# Patient Record
Sex: MALE | Race: WHITE | NOT HISPANIC OR LATINO | Employment: FULL TIME | URBAN - METROPOLITAN AREA
[De-identification: names, ages, dates, MRNs, and addresses within clinical notes are randomized per-mention and may not be internally consistent; named-entity substitution may affect disease eponyms.]

---

## 2020-03-14 ENCOUNTER — OFFICE VISIT (OUTPATIENT)
Dept: URGENT CARE | Facility: CLINIC | Age: 40
End: 2020-03-14
Payer: COMMERCIAL

## 2020-03-14 VITALS
WEIGHT: 210 LBS | DIASTOLIC BLOOD PRESSURE: 89 MMHG | BODY MASS INDEX: 31.1 KG/M2 | HEIGHT: 69 IN | HEART RATE: 89 BPM | TEMPERATURE: 98 F | OXYGEN SATURATION: 96 % | SYSTOLIC BLOOD PRESSURE: 159 MMHG | RESPIRATION RATE: 17 BRPM

## 2020-03-14 DIAGNOSIS — R09.81 NASAL CONGESTION: ICD-10-CM

## 2020-03-14 DIAGNOSIS — B34.9 VIRAL SYNDROME: Primary | ICD-10-CM

## 2020-03-14 DIAGNOSIS — R06.2 WHEEZING: ICD-10-CM

## 2020-03-14 DIAGNOSIS — J34.89 SINUS PRESSURE: ICD-10-CM

## 2020-03-14 DIAGNOSIS — R05.9 COUGH: ICD-10-CM

## 2020-03-14 DIAGNOSIS — R09.82 POST-NASAL DRIP: ICD-10-CM

## 2020-03-14 DIAGNOSIS — R06.02 SHORTNESS OF BREATH: ICD-10-CM

## 2020-03-14 DIAGNOSIS — R03.0 ELEVATED BLOOD PRESSURE READING: ICD-10-CM

## 2020-03-14 LAB
CTP QC/QA: YES
FLUAV AG NPH QL: NEGATIVE
FLUBV AG NPH QL: NEGATIVE

## 2020-03-14 PROCEDURE — 87804 INFLUENZA ASSAY W/OPTIC: CPT | Mod: 59,QW,S$GLB, | Performed by: NURSE PRACTITIONER

## 2020-03-14 PROCEDURE — 99214 PR OFFICE/OUTPT VISIT, EST, LEVL IV, 30-39 MIN: ICD-10-PCS | Mod: 25,S$GLB,, | Performed by: NURSE PRACTITIONER

## 2020-03-14 PROCEDURE — 87804 POCT INFLUENZA A/B: ICD-10-PCS | Mod: 59,QW,S$GLB, | Performed by: NURSE PRACTITIONER

## 2020-03-14 PROCEDURE — 99214 OFFICE O/P EST MOD 30 MIN: CPT | Mod: 25,S$GLB,, | Performed by: NURSE PRACTITIONER

## 2020-03-14 PROCEDURE — 71046 XR CHEST PA AND LATERAL: ICD-10-PCS | Mod: FY,S$GLB,, | Performed by: RADIOLOGY

## 2020-03-14 PROCEDURE — 71046 X-RAY EXAM CHEST 2 VIEWS: CPT | Mod: FY,S$GLB,, | Performed by: RADIOLOGY

## 2020-03-14 RX ORDER — LISDEXAMFETAMINE DIMESYLATE 60 MG/1
60 CAPSULE ORAL EVERY MORNING
COMMUNITY

## 2020-03-14 RX ORDER — PROMETHAZINE HYDROCHLORIDE AND DEXTROMETHORPHAN HYDROBROMIDE 6.25; 15 MG/5ML; MG/5ML
5 SYRUP ORAL EVERY 6 HOURS PRN
Qty: 100 ML | Refills: 0 | Status: SHIPPED | OUTPATIENT
Start: 2020-03-14 | End: 2020-03-21

## 2020-03-14 RX ORDER — ALBUTEROL SULFATE 90 UG/1
2 AEROSOL, METERED RESPIRATORY (INHALATION) EVERY 6 HOURS PRN
Qty: 1 G | Refills: 0 | Status: SHIPPED | OUTPATIENT
Start: 2020-03-14 | End: 2020-03-21

## 2020-03-14 RX ORDER — BENZONATATE 100 MG/1
100 CAPSULE ORAL 3 TIMES DAILY PRN
Qty: 21 CAPSULE | Refills: 0 | Status: SHIPPED | OUTPATIENT
Start: 2020-03-14 | End: 2020-03-21

## 2020-03-14 RX ORDER — AZELASTINE 1 MG/ML
1 SPRAY, METERED NASAL 2 TIMES DAILY
Qty: 30 ML | Refills: 0 | Status: SHIPPED | OUTPATIENT
Start: 2020-03-14 | End: 2020-03-28

## 2020-03-14 NOTE — PROGRESS NOTES
"Subjective:       Patient ID: Quan Adams is a 39 y.o. male.    Vitals:  height is 5' 9" (1.753 m) and weight is 95.3 kg (210 lb). His tympanic temperature is 97.7 °F (36.5 °C). His blood pressure is 159/89 (abnormal) and his pulse is 89. His respiration is 17 and oxygen saturation is 96%.     Chief Complaint: Cough    Patient presents with cough , sinus congestion and green sputum. Patient says he is congested and sometimes complains of shortness of breath . Patient says he was checked at work by a nurse and says his lungs are clear . Patient denies fever and says this illness has been going around his work for a few months . Onset 3 days ago .     Cough   This is a new problem. The current episode started in the past 7 days (3 days ago ). The problem has been unchanged. The problem occurs hourly. The cough is productive of sputum. Associated symptoms include nasal congestion, postnasal drip and shortness of breath. Pertinent negatives include no chest pain, chills, fever, headaches, myalgias, rash or sore throat. Treatments tried: nyquil,mucinex  The treatment provided mild relief. There is no history of asthma, bronchiectasis or emphysema.       Constitution: Negative for chills, fatigue and fever.   HENT: Positive for congestion and postnasal drip. Negative for sore throat.    Neck: Negative for painful lymph nodes.   Cardiovascular: Negative for chest pain and leg swelling.   Eyes: Negative for double vision and blurred vision.   Respiratory: Positive for cough, sputum production and shortness of breath.    Gastrointestinal: Negative for nausea, vomiting and diarrhea.   Genitourinary: Negative for dysuria, frequency and urgency.   Musculoskeletal: Negative for joint pain, joint swelling, muscle cramps and muscle ache.   Skin: Negative for color change, pale and rash.   Allergic/Immunologic: Negative for seasonal allergies.   Neurological: Negative for dizziness, history of vertigo, light-headedness, passing " out and headaches.   Hematologic/Lymphatic: Negative for swollen lymph nodes, easy bruising/bleeding and history of blood clots. Does not bruise/bleed easily.   Psychiatric/Behavioral: Negative for nervous/anxious, sleep disturbance and depression. The patient is not nervous/anxious.        Objective:      Physical Exam   Constitutional: He is oriented to person, place, and time. He appears well-developed and well-nourished. He is cooperative.  Non-toxic appearance. He does not have a sickly appearance. He does not appear ill. No distress.   HENT:   Head: Normocephalic and atraumatic.   Right Ear: Hearing, external ear and ear canal normal. A middle ear effusion is present.   Left Ear: Hearing, external ear and ear canal normal. A middle ear effusion is present.   Nose: Mucosal edema present. No rhinorrhea or nasal deformity. No epistaxis. Right sinus exhibits maxillary sinus tenderness. Right sinus exhibits no frontal sinus tenderness. Left sinus exhibits maxillary sinus tenderness. Left sinus exhibits no frontal sinus tenderness.   Mouth/Throat: Uvula is midline and mucous membranes are normal. No trismus in the jaw. Normal dentition. No uvula swelling. Posterior oropharyngeal erythema present. No oropharyngeal exudate or posterior oropharyngeal edema.   Eyes: Conjunctivae and lids are normal. No scleral icterus.   Neck: Trachea normal, full passive range of motion without pain and phonation normal. Neck supple. No neck rigidity. No edema and no erythema present.   Cardiovascular: Normal rate, regular rhythm, normal heart sounds, intact distal pulses and normal pulses.   Pulmonary/Chest: Effort normal. No respiratory distress. He has no decreased breath sounds. He has wheezes in the right lower field. He has rhonchi in the right lower field and the left lower field.   Abdominal: Normal appearance.   Musculoskeletal: Normal range of motion. He exhibits no edema or deformity.   Lymphadenopathy:     He has no  cervical adenopathy.   Neurological: He is alert and oriented to person, place, and time. He exhibits normal muscle tone. Coordination normal.   Skin: Skin is warm, dry, intact, not diaphoretic and not pale.   Psychiatric: He has a normal mood and affect. His speech is normal and behavior is normal. Judgment and thought content normal. Cognition and memory are normal.   Nursing note and vitals reviewed.        Assessment:       1. Viral syndrome    2. Nasal congestion    3. Shortness of breath    4. Post-nasal drip    5. Elevated blood pressure reading    6. Cough    7. Sinus pressure    8. Wheezing        Plan:         Viral syndrome  -     azelastine (ASTELIN) 137 mcg (0.1 %) nasal spray; 1 spray (137 mcg total) by Nasal route 2 (two) times daily. for 14 days  Dispense: 30 mL; Refill: 0    Nasal congestion  -     POCT Influenza A/B    Shortness of breath  -     X-Ray Chest PA And Lateral; Future; Expected date: 03/14/2020  -     albuterol (PROVENTIL HFA) 90 mcg/actuation inhaler; Inhale 2 puffs into the lungs every 6 (six) hours as needed for Wheezing. Rescue  Dispense: 1 g; Refill: 0    Post-nasal drip    Elevated blood pressure reading    Cough  -     benzonatate (TESSALON) 100 MG capsule; Take 1 capsule (100 mg total) by mouth 3 (three) times daily as needed for Cough.  Dispense: 21 capsule; Refill: 0  -     promethazine-dextromethorphan (PROMETHAZINE-DM) 6.25-15 mg/5 mL Syrp; Take 5 mLs by mouth every 6 (six) hours as needed (May take every 4 hours, PRN. DoNOT take more than 30 mL in 24 hours.).  Dispense: 100 mL; Refill: 0    Sinus pressure    Wheezing  -     albuterol (PROVENTIL HFA) 90 mcg/actuation inhaler; Inhale 2 puffs into the lungs every 6 (six) hours as needed for Wheezing. Rescue  Dispense: 1 g; Refill: 0      Results for orders placed or performed in visit on 03/14/20   POCT Influenza A/B   Result Value Ref Range    Rapid Influenza A Ag Negative Negative    Rapid Influenza B Ag Negative Negative     " Acceptable Yes      Patient Instructions     Always follow your healthcare professional's instructions.    You have received urgent care diagnosis and treatment and you may be released before all of your medical problems are known or treated. Unless you have been given a referral, you (the patient), will arrange for follow-up care as instructed.     If you have been given a referral, [facility] will contact you (their phone number is [phone]). If they do NOT call you by the end of the business day, please call them the following day.    You have been diagnosed with Viral Syndrome. TREATMENT: YOUR TREATMENT IS AIMED AT SYMPTOM MANAGEMENT.       A viral illness may cause a number of symptoms. The symptoms depend on the part of the body that the virus affects. If it settles in your nose, throat, and lungs, it may cause cough, sore throat, congestion, and sometimes headache. If it settles in your stomach and intestinal tract, it may cause vomiting and diarrhea. Sometimes it causes vague symptoms like "aching all over," feeling tired, loss of appetite, or fever. A viral illness usually lasts 1 to 2 weeks, but sometimes it lasts longer. In some cases, a more serious infection can look like a viral syndrome in the first few days of the illness; therefore, you may need another exam and additional tests to know the difference.          Home care  Follow these guidelines for taking care of yourself at home:  · If symptoms are severe, rest at home for the first 2 to 3 days.  · Stay away from cigarette smoke - both your smoke and the smoke from others.  · You may use over-the-counter acetaminophen or ibuprofen for fever, muscle aching, and headache, unless another medicine was prescribed for this. If you have chronic liver or kidney disease or ever had a stomach ulcer or GI bleeding, talk with your doctor before using these medicines. No one who is younger than 18 and ill with a fever should take aspirin. It " may cause severe disease or death.  · Your appetite may be poor, so a light diet is fine. Avoid dehydration by drinking 8 to 12 8-ounce glasses of fluids each day. This may include water; orange juice; lemonade; apple, grape, and cranberry juice; clear fruit drinks; electrolyte replacement and sports drinks; and decaffeinated teas and coffee. If you have been diagnosed with a kidney disease, ask your doctor how much and what types of fluids you should drink to prevent dehydration. If you have kidney disease, drinking too much fluid can cause it build up in the your body and be dangerous to your health.  · Over-the-counter remedies won't shorten the length of the illness but may be helpful for cough, sore throat; and nasal and sinus congestion. Don't use decongestants if you have high blood pressure.    Follow-up care  Follow up with your healthcare provider if your symptoms continue for more than 7 days.    Fever Cough Body Aches Nausea and Vomiting Diarrhea Congestion or Runny Nose    OTC Tylenol   OTC NSAIDs  Tessalon Pearls   Phenergan DM   OTC cough medications  Mobic   OTC Tylenol   OTC NSAIDs  Zofran   Phenergan   OTC Emetrol  DO NOT take ant-diarrheal medications  OTC Claritin, Zyrtec, Allegra, OR Xyzal   OTC Flonase   OTC Benadryl      Cough Allergy Symptoms Asthma Headache or Body Aches   Tessalon Perles are a non-narcotic cough medicine. It works by numbing the throat and lungs, making the cough reflex less active, it is used to relieve coughing during the day. If you have been given Phenergan DM cough syrup, you do NOT need to take both medications at the same time.    Phenergan DM is a combination medication that is used to treat cough. Phenergan DM works like an antihistamine and cough suppressant. This medication will make you sleepy like Benadryl, have a drying effect, and act on a part of the brain (cough center) to reduce the need to cough. DO NOT take Benadryl and Phenergan together.  "You may take over-the-counter claritin, zyrtec, allegra, or xyzal as directed, these are antihistamines that will work to dry up secretions/mucus. Antihistamines work to block the effects of a certain natural substance (histamine), which causes allergy symptoms.    OR    Use over-the-counter Flonase (a nasal steroid) or prescribed Azelastine (a nasal antihistamine) to treat runny nose, sneezing, itchy nose, nasal congestion, and postnasal drip.    Proper administration is to "look down at your toes and aim for your nose". The goal is to aim for your nasolabial folds, the creases in your nose. If you feel the medication drip down your throat, you have NOT administered it correctly. If you can "smell the roses" (floral scent), then you have administered it correctly. If you have a history of asthma, expressed a concern about wheezing or have been told you were wheezing during your exam today, you are being given Albuterol. Albuterol is a bronchodilator that relaxes muscles in the airways and increases air flow to the lungs; it is used to treat or prevent bronchospasm, or narrowing of the airways in the lungs.     If you have a history of asthma, expressed a concern about wheezing or have been told you were wheezing during your exam today and are NOT being prescribed Albuterol that is because you have insured me that you have an adequate supply of the drug at home.    Mobic is a nonsteroidal anti-inflammatory drug (NSAID), it is used to treat inflammation. It reduces pain, swelling, and stiffness of the joints. Please do NOT take any other NSAID medications while on this medication, you can take Tylenol if you feel the need. If you were not prescribed an anti-inflammatory medication, and if you do not have any history of stomach/intestinal ulcers, or kidney disease, or are not taking a blood thinner such as Coumadin, Plavix, Pradaxa, Eloquis, or Xaralta for example, it is OK to take over the counter Ibuprofen or Advil " or Motrin or Aleve as directed.  Do not take any of these medications on an empty stomach.         Dehydration (Adult)  Dehydration occurs when your body loses too much fluid. This may be the result of prolonged vomiting or diarrhea, excessive sweating, or a high fever. It may also happen if you don't drink enough fluid when you're sick or out in the heat. Misuse of diuretics (water pills) can also be a cause.  Symptoms include thirst and decreased urine output. You may also feel dizzy, weak, fatigued, or very drowsy. The diet described below is usually enough to treat dehydration. In some cases, you may need medicine.    Home care  · Drink at least 12 8-ounce glasses of fluid every day to resolve the dehydration. Fluid may include water; orange juice; lemonade; apple, grape, or cranberry juice; clear fruit drinks; electrolyte replacement and sports drinks; and teas and coffee without caffeine. If you have been diagnosed with a kidney disease, ask your doctor how much and what types of fluids you should drink to prevent dehydration. If you have kidney disease, fluid can build up in the body. This can be dangerous to your health.  · If you have a fever, muscle aches, or a headache as a result of a cold or flu, you may take acetaminophen or ibuprofen, unless another medicine was prescribed. If you have chronic liver or kidney disease, or have ever had a stomach ulcer or gastrointestinal bleeding, talk with your health care provider before using these medicines. Don't take aspirin if you are younger than 18 and have a fever. Aspirin raises the chance for severe liver injury.    When to seek medical advice  DEHYDRATION:  · Continued vomiting  · Frequent diarrhea (more than 5 times a day); blood (red or black color) or mucus in diarrhea  · Blood in vomit or stool  · Swollen abdomen or increasing abdominal pain  · Weakness, dizziness, or fainting  · Unusual drowsiness or confusion  · Reduced urine output or extreme  thirst    FEVER:  Call your healthcare provider right away if any of these occur:  · Your child is 3 months old or younger and has a fever of 100.4°F (38°C) or higher. Get medical care right away. Fever in a young baby can be a sign of a dangerous infection.  · Your child is of any age and has repeated fevers above 104°F (40°C).  · Your child is younger than 2 years of age and a fever of 100.4°F (38°C) continues for more than 1 day.  · A fever of 100.4°F (38°C) for more than 3 days in anyone older than 2 years of age.       Why didn't I get a steroid today?    The benefits are small and, unlike topical glucocorticoids, systemic glucocorticoids possess a significant side effect profile. Major side effects include:    · Effects immunity predisposing you to getting a more severe infection and increases your white blood cell count. You have the flu, you do not need anything to weaken your immune system right now.  · Young children -- Growth impairment   · Skin consequences: Skin thinning and ecchymoses, Cushingoid appearance (rounded face), acne, weight gain, mild hirsutism, facial erythema, and striae.  · Eye consequences: Cataracts, increased intraocular pressure, exophthalmos.   · Cardiovascular consequences: Fluid retention, premature atherosclerotic disease, and arrhythmias.   · GI consequences: Increased risk for adverse gastrointestinal effects, such as gastritis, ulcer formation, and gastrointestinal bleeding  · Bone and muscle consequences: Osteoporosis, osteonecrosis, and myopathy.  · Neuropsychiatric effects: Mood disorders, psychosis, memory impairment, and   · Metabolic and Endocrine consequences: Suppress the hypothalamic-pituitary-adrenal (HPA) axis and increase blood sugar.     Can I just have a shot instead of pills?  No. If you are sick, you need a course of treatment (not just a one time dose).     Why didn't I get an antibiotic today?  You have been diagnosed with a virus an antibiotic will not help  "you. Antibiotics work by destroying the cell walls of bacteria, viruses do not have cell walls.     When to seek medical advice:    DEHYDRATION:  · Continued vomiting  · Frequent diarrhea (more than 5 times a day); blood (red or black color) or mucus in diarrhea  · Blood in vomit or stool  · Swollen abdomen or increasing abdominal pain  · Reduced urine output or extreme thirst  · Repeated vomiting after the first 4 hours on fluids  · Occasional vomiting for more than 48 hours  · Frequent diarrhea (more than 5 times a day), blood in diarrhea (red or black color), or mucus in diarrhea  · Blood in vomit or stool  · Swollen abdomen or signs of abdominal pain  · No urine for 8 hours, no tears when crying, "sunken" eyes, or dry mouth  · Unusual behavior changes, fussiness, drowsiness, confusion, or seizure  · Fever (see Fever and children, below)  FEVER:  Call your healthcare provider right away if any of these occur:  · Your child is 3 months old or younger and has a fever of 100.4°F (38°C) or higher. Get medical care right away. Fever in a young baby can be a sign of a dangerous infection.  · Your child is of any age and has repeated fevers above 104°F (40°C).  · Your child is younger than 2 years of age and a fever of 100.4°F (38°C) continues for more than 1 day.  · A fever of 100.4°F (38°C) for more than 3 days in anyone older than 2 years of age.  Call 911  Call 911 or your local emergency services number if the child shows any of these symptoms or signs:  · Trouble breathing  · Confusion  · Is very drowsy or difficult to awaken  · Fainting or loss of consciousness  · Rapid heart rate  · Seizure  · Stiff neck     Your provider discussed your plan of care with you during your physical exam. It was reviewed once more by the provider when giving you an after visit summary. If the patient is a minor, the discharge instructions were discussed with an adult and that adult acknowledge their understanding of the provider's " teaching.

## 2020-03-14 NOTE — PATIENT INSTRUCTIONS
"Always follow your healthcare professional's instructions.    You have received urgent care diagnosis and treatment and you may be released before all of your medical problems are known or treated. Unless you have been given a referral, you (the patient), will arrange for follow-up care as instructed.     If you have been given a referral, [facility] will contact you (their phone number is [phone]). If they do NOT call you by the end of the business day, please call them the following day.    You have been diagnosed with Viral Syndrome. TREATMENT: YOUR TREATMENT IS AIMED AT SYMPTOM MANAGEMENT.       A viral illness may cause a number of symptoms. The symptoms depend on the part of the body that the virus affects. If it settles in your nose, throat, and lungs, it may cause cough, sore throat, congestion, and sometimes headache. If it settles in your stomach and intestinal tract, it may cause vomiting and diarrhea. Sometimes it causes vague symptoms like "aching all over," feeling tired, loss of appetite, or fever. A viral illness usually lasts 1 to 2 weeks, but sometimes it lasts longer. In some cases, a more serious infection can look like a viral syndrome in the first few days of the illness; therefore, you may need another exam and additional tests to know the difference.          Home care  Follow these guidelines for taking care of yourself at home:  · If symptoms are severe, rest at home for the first 2 to 3 days.  · Stay away from cigarette smoke - both your smoke and the smoke from others.  · You may use over-the-counter acetaminophen or ibuprofen for fever, muscle aching, and headache, unless another medicine was prescribed for this. If you have chronic liver or kidney disease or ever had a stomach ulcer or GI bleeding, talk with your doctor before using these medicines. No one who is younger than 18 and ill with a fever should take aspirin. It may cause severe disease or death.  · Your appetite may be " poor, so a light diet is fine. Avoid dehydration by drinking 8 to 12 8-ounce glasses of fluids each day. This may include water; orange juice; lemonade; apple, grape, and cranberry juice; clear fruit drinks; electrolyte replacement and sports drinks; and decaffeinated teas and coffee. If you have been diagnosed with a kidney disease, ask your doctor how much and what types of fluids you should drink to prevent dehydration. If you have kidney disease, drinking too much fluid can cause it build up in the your body and be dangerous to your health.  · Over-the-counter remedies won't shorten the length of the illness but may be helpful for cough, sore throat; and nasal and sinus congestion. Don't use decongestants if you have high blood pressure.    Follow-up care  Follow up with your healthcare provider if your symptoms continue for more than 7 days.    Fever Cough Body Aches Nausea and Vomiting Diarrhea Congestion or Runny Nose    OTC Tylenol   OTC NSAIDs  Tessalon Pearls   Phenergan DM   OTC cough medications  Mobic   OTC Tylenol   OTC NSAIDs  Zofran   Phenergan   OTC Emetrol  DO NOT take ant-diarrheal medications  OTC Claritin, Zyrtec, Allegra, OR Xyzal   OTC Flonase   OTC Benadryl      Cough Allergy Symptoms Asthma Headache or Body Aches   Tessalon Perles are a non-narcotic cough medicine. It works by numbing the throat and lungs, making the cough reflex less active, it is used to relieve coughing during the day. If you have been given Phenergan DM cough syrup, you do NOT need to take both medications at the same time.    Phenergan DM is a combination medication that is used to treat cough. Phenergan DM works like an antihistamine and cough suppressant. This medication will make you sleepy like Benadryl, have a drying effect, and act on a part of the brain (cough center) to reduce the need to cough. DO NOT take Benadryl and Phenergan together. You may take over-the-counter claritin, zyrtec, allegra, or  "xyzal as directed, these are antihistamines that will work to dry up secretions/mucus. Antihistamines work to block the effects of a certain natural substance (histamine), which causes allergy symptoms.    OR    Use over-the-counter Flonase (a nasal steroid) or prescribed Azelastine (a nasal antihistamine) to treat runny nose, sneezing, itchy nose, nasal congestion, and postnasal drip.    Proper administration is to "look down at your toes and aim for your nose". The goal is to aim for your nasolabial folds, the creases in your nose. If you feel the medication drip down your throat, you have NOT administered it correctly. If you can "smell the roses" (floral scent), then you have administered it correctly. If you have a history of asthma, expressed a concern about wheezing or have been told you were wheezing during your exam today, you are being given Albuterol. Albuterol is a bronchodilator that relaxes muscles in the airways and increases air flow to the lungs; it is used to treat or prevent bronchospasm, or narrowing of the airways in the lungs.     If you have a history of asthma, expressed a concern about wheezing or have been told you were wheezing during your exam today and are NOT being prescribed Albuterol that is because you have insured me that you have an adequate supply of the drug at home.    Mobic is a nonsteroidal anti-inflammatory drug (NSAID), it is used to treat inflammation. It reduces pain, swelling, and stiffness of the joints. Please do NOT take any other NSAID medications while on this medication, you can take Tylenol if you feel the need. If you were not prescribed an anti-inflammatory medication, and if you do not have any history of stomach/intestinal ulcers, or kidney disease, or are not taking a blood thinner such as Coumadin, Plavix, Pradaxa, Eloquis, or Xaralta for example, it is OK to take over the counter Ibuprofen or Advil or Motrin or Aleve as directed.  Do not take any of these " medications on an empty stomach.         Dehydration (Adult)  Dehydration occurs when your body loses too much fluid. This may be the result of prolonged vomiting or diarrhea, excessive sweating, or a high fever. It may also happen if you don't drink enough fluid when you're sick or out in the heat. Misuse of diuretics (water pills) can also be a cause.  Symptoms include thirst and decreased urine output. You may also feel dizzy, weak, fatigued, or very drowsy. The diet described below is usually enough to treat dehydration. In some cases, you may need medicine.    Home care  · Drink at least 12 8-ounce glasses of fluid every day to resolve the dehydration. Fluid may include water; orange juice; lemonade; apple, grape, or cranberry juice; clear fruit drinks; electrolyte replacement and sports drinks; and teas and coffee without caffeine. If you have been diagnosed with a kidney disease, ask your doctor how much and what types of fluids you should drink to prevent dehydration. If you have kidney disease, fluid can build up in the body. This can be dangerous to your health.  · If you have a fever, muscle aches, or a headache as a result of a cold or flu, you may take acetaminophen or ibuprofen, unless another medicine was prescribed. If you have chronic liver or kidney disease, or have ever had a stomach ulcer or gastrointestinal bleeding, talk with your health care provider before using these medicines. Don't take aspirin if you are younger than 18 and have a fever. Aspirin raises the chance for severe liver injury.    When to seek medical advice  DEHYDRATION:  · Continued vomiting  · Frequent diarrhea (more than 5 times a day); blood (red or black color) or mucus in diarrhea  · Blood in vomit or stool  · Swollen abdomen or increasing abdominal pain  · Weakness, dizziness, or fainting  · Unusual drowsiness or confusion  · Reduced urine output or extreme thirst    FEVER:  Call your healthcare provider right away if  any of these occur:  · Your child is 3 months old or younger and has a fever of 100.4°F (38°C) or higher. Get medical care right away. Fever in a young baby can be a sign of a dangerous infection.  · Your child is of any age and has repeated fevers above 104°F (40°C).  · Your child is younger than 2 years of age and a fever of 100.4°F (38°C) continues for more than 1 day.  · A fever of 100.4°F (38°C) for more than 3 days in anyone older than 2 years of age.       Why didn't I get a steroid today?    The benefits are small and, unlike topical glucocorticoids, systemic glucocorticoids possess a significant side effect profile. Major side effects include:    · Effects immunity predisposing you to getting a more severe infection and increases your white blood cell count. You have the flu, you do not need anything to weaken your immune system right now.  · Young children -- Growth impairment   · Skin consequences: Skin thinning and ecchymoses, Cushingoid appearance (rounded face), acne, weight gain, mild hirsutism, facial erythema, and striae.  · Eye consequences: Cataracts, increased intraocular pressure, exophthalmos.   · Cardiovascular consequences: Fluid retention, premature atherosclerotic disease, and arrhythmias.   · GI consequences: Increased risk for adverse gastrointestinal effects, such as gastritis, ulcer formation, and gastrointestinal bleeding  · Bone and muscle consequences: Osteoporosis, osteonecrosis, and myopathy.  · Neuropsychiatric effects: Mood disorders, psychosis, memory impairment, and   · Metabolic and Endocrine consequences: Suppress the hypothalamic-pituitary-adrenal (HPA) axis and increase blood sugar.     Can I just have a shot instead of pills?  No. If you are sick, you need a course of treatment (not just a one time dose).     Why didn't I get an antibiotic today?  You have been diagnosed with a virus an antibiotic will not help you. Antibiotics work by destroying the cell walls of  "bacteria, viruses do not have cell walls.     When to seek medical advice:    DEHYDRATION:  · Continued vomiting  · Frequent diarrhea (more than 5 times a day); blood (red or black color) or mucus in diarrhea  · Blood in vomit or stool  · Swollen abdomen or increasing abdominal pain  · Reduced urine output or extreme thirst  · Repeated vomiting after the first 4 hours on fluids  · Occasional vomiting for more than 48 hours  · Frequent diarrhea (more than 5 times a day), blood in diarrhea (red or black color), or mucus in diarrhea  · Blood in vomit or stool  · Swollen abdomen or signs of abdominal pain  · No urine for 8 hours, no tears when crying, "sunken" eyes, or dry mouth  · Unusual behavior changes, fussiness, drowsiness, confusion, or seizure  · Fever (see Fever and children, below)  FEVER:  Call your healthcare provider right away if any of these occur:  · Your child is 3 months old or younger and has a fever of 100.4°F (38°C) or higher. Get medical care right away. Fever in a young baby can be a sign of a dangerous infection.  · Your child is of any age and has repeated fevers above 104°F (40°C).  · Your child is younger than 2 years of age and a fever of 100.4°F (38°C) continues for more than 1 day.  · A fever of 100.4°F (38°C) for more than 3 days in anyone older than 2 years of age.  Call 911  Call 911 or your local emergency services number if the child shows any of these symptoms or signs:  · Trouble breathing  · Confusion  · Is very drowsy or difficult to awaken  · Fainting or loss of consciousness  · Rapid heart rate  · Seizure  · Stiff neck     Your provider discussed your plan of care with you during your physical exam. It was reviewed once more by the provider when giving you an after visit summary. If the patient is a minor, the discharge instructions were discussed with an adult and that adult acknowledge their understanding of the provider's teaching.      "

## 2020-03-18 ENCOUNTER — OFFICE VISIT (OUTPATIENT)
Dept: URGENT CARE | Facility: CLINIC | Age: 40
End: 2020-03-18
Payer: COMMERCIAL

## 2020-03-18 VITALS
OXYGEN SATURATION: 98 % | HEART RATE: 73 BPM | HEIGHT: 69 IN | RESPIRATION RATE: 18 BRPM | WEIGHT: 212 LBS | SYSTOLIC BLOOD PRESSURE: 126 MMHG | TEMPERATURE: 97 F | BODY MASS INDEX: 31.4 KG/M2 | DIASTOLIC BLOOD PRESSURE: 73 MMHG

## 2020-03-18 DIAGNOSIS — R06.02 SHORTNESS OF BREATH: ICD-10-CM

## 2020-03-18 DIAGNOSIS — B34.9 VIRAL SYNDROME: Primary | ICD-10-CM

## 2020-03-18 DIAGNOSIS — J34.89 SINUS PRESSURE: ICD-10-CM

## 2020-03-18 DIAGNOSIS — R09.81 NASAL CONGESTION: ICD-10-CM

## 2020-03-18 DIAGNOSIS — Z87.898 HISTORY OF WHEEZING: ICD-10-CM

## 2020-03-18 DIAGNOSIS — R05.9 COUGH: ICD-10-CM

## 2020-03-18 DIAGNOSIS — R09.82 POST-NASAL DRIP: ICD-10-CM

## 2020-03-18 LAB
CTP QC/QA: YES
FLUAV AG NPH QL: NEGATIVE
FLUBV AG NPH QL: NEGATIVE

## 2020-03-18 PROCEDURE — 99214 PR OFFICE/OUTPT VISIT, EST, LEVL IV, 30-39 MIN: ICD-10-PCS | Mod: 25,S$GLB,, | Performed by: NURSE PRACTITIONER

## 2020-03-18 PROCEDURE — 71046 X-RAY EXAM CHEST 2 VIEWS: CPT | Mod: FY,S$GLB,, | Performed by: RADIOLOGY

## 2020-03-18 PROCEDURE — 71046 XR CHEST PA AND LATERAL: ICD-10-PCS | Mod: FY,S$GLB,, | Performed by: RADIOLOGY

## 2020-03-18 PROCEDURE — 87804 POCT INFLUENZA A/B: ICD-10-PCS | Mod: QW,S$GLB,, | Performed by: NURSE PRACTITIONER

## 2020-03-18 PROCEDURE — 87804 INFLUENZA ASSAY W/OPTIC: CPT | Mod: QW,S$GLB,, | Performed by: NURSE PRACTITIONER

## 2020-03-18 PROCEDURE — 99214 OFFICE O/P EST MOD 30 MIN: CPT | Mod: 25,S$GLB,, | Performed by: NURSE PRACTITIONER

## 2020-03-18 RX ORDER — PREDNISONE 20 MG/1
20 TABLET ORAL DAILY
Qty: 3 TABLET | Refills: 0 | Status: SHIPPED | OUTPATIENT
Start: 2020-03-18 | End: 2020-03-21

## 2020-03-18 NOTE — LETTER
March 18, 2020      [facility]  [address], [address]  [place] [address]  Phone: [phone]  Fax: [phone]       Patient: Quan Adams   YOB: 1980  Date of Visit: 03/18/2020    To Whom It May Concern:    Aurea Adams  was at [facility] on 03/18/2020. He may return to work/school when fever free for 24 hrs without administration of fever reducing medications. The patient is being referred to pulmonology and that provider may revise this return to work status. If you have any questions or concerns, or if I can be of further assistance, please do not hesitate to contact me.    Sincerely,    Luiza Almaguer NP

## 2020-03-18 NOTE — PATIENT INSTRUCTIONS
"Always follow your healthcare professional's instructions.    You have received urgent care diagnosis and treatment and you may be released before all of your medical problems are known or treated. Unless you have been given a referral, you (the patient), will arrange for follow-up care as instructed.     If you have been given a referral, [facility] will contact you (their phone number is [phone]). If they do NOT call you by the end of the business day, please call them the following day.    You have been diagnosed with Viral Syndrome. TREATMENT: YOUR TREATMENT IS AIMED AT SYMPTOM MANAGEMENT.       A viral illness may cause a number of symptoms. The symptoms depend on the part of the body that the virus affects. If it settles in your nose, throat, and lungs, it may cause cough, sore throat, congestion, and sometimes headache. If it settles in your stomach and intestinal tract, it may cause vomiting and diarrhea. Sometimes it causes vague symptoms like "aching all over," feeling tired, loss of appetite, or fever. A viral illness usually lasts 1 to 2 weeks, but sometimes it lasts longer. In some cases, a more serious infection can look like a viral syndrome in the first few days of the illness; therefore, you may need another exam and additional tests to know the difference.          Home care  Follow these guidelines for taking care of yourself at home:  · If symptoms are severe, rest at home for the first 2 to 3 days.  · Stay away from cigarette smoke - both your smoke and the smoke from others.  · You may use over-the-counter acetaminophen or ibuprofen for fever, muscle aching, and headache, unless another medicine was prescribed for this. If you have chronic liver or kidney disease or ever had a stomach ulcer or GI bleeding, talk with your doctor before using these medicines. No one who is younger than 18 and ill with a fever should take aspirin. It may cause severe disease or death.  · Your appetite may be " poor, so a light diet is fine. Avoid dehydration by drinking 8 to 12 8-ounce glasses of fluids each day. This may include water; orange juice; lemonade; apple, grape, and cranberry juice; clear fruit drinks; electrolyte replacement and sports drinks; and decaffeinated teas and coffee. If you have been diagnosed with a kidney disease, ask your doctor how much and what types of fluids you should drink to prevent dehydration. If you have kidney disease, drinking too much fluid can cause it build up in the your body and be dangerous to your health.  · Over-the-counter remedies won't shorten the length of the illness but may be helpful for cough, sore throat; and nasal and sinus congestion. Don't use decongestants if you have high blood pressure.    Follow-up care  Follow up with your healthcare provider if your symptoms continue for more than 7 days.    Fever Cough Body Aches Nausea and Vomiting Diarrhea Congestion or Runny Nose    OTC Tylenol   OTC NSAIDs  Tessalon Pearls   Phenergan DM   OTC cough medications  Mobic   OTC Tylenol   OTC NSAIDs  Zofran   Phenergan   OTC Emetrol  DO NOT take ant-diarrheal medications  OTC Claritin, Zyrtec, Allegra, OR Xyzal   OTC Flonase   OTC Benadryl      Cough Allergy Symptoms Asthma Headache or Body Aches   Tessalon Perles are a non-narcotic cough medicine. It works by numbing the throat and lungs, making the cough reflex less active, it is used to relieve coughing during the day. If you have been given Phenergan DM cough syrup, you do NOT need to take both medications at the same time.    Phenergan DM is a combination medication that is used to treat cough. Phenergan DM works like an antihistamine and cough suppressant. This medication will make you sleepy like Benadryl, have a drying effect, and act on a part of the brain (cough center) to reduce the need to cough. DO NOT take Benadryl and Phenergan together. You may take over-the-counter claritin, zyrtec, allegra, or  "xyzal as directed, these are antihistamines that will work to dry up secretions/mucus. Antihistamines work to block the effects of a certain natural substance (histamine), which causes allergy symptoms.    OR    Use over-the-counter Flonase (a nasal steroid) or prescribed Azelastine (a nasal antihistamine) to treat runny nose, sneezing, itchy nose, nasal congestion, and postnasal drip.    Proper administration is to "look down at your toes and aim for your nose". The goal is to aim for your nasolabial folds, the creases in your nose. If you feel the medication drip down your throat, you have NOT administered it correctly. If you can "smell the roses" (floral scent), then you have administered it correctly. If you have a history of asthma, expressed a concern about wheezing or have been told you were wheezing during your exam today, you are being given Albuterol. Albuterol is a bronchodilator that relaxes muscles in the airways and increases air flow to the lungs; it is used to treat or prevent bronchospasm, or narrowing of the airways in the lungs.     If you have a history of asthma, expressed a concern about wheezing or have been told you were wheezing during your exam today and are NOT being prescribed Albuterol that is because you have insured me that you have an adequate supply of the drug at home.    Mobic is a nonsteroidal anti-inflammatory drug (NSAID), it is used to treat inflammation. It reduces pain, swelling, and stiffness of the joints. Please do NOT take any other NSAID medications while on this medication, you can take Tylenol if you feel the need. If you were not prescribed an anti-inflammatory medication, and if you do not have any history of stomach/intestinal ulcers, or kidney disease, or are not taking a blood thinner such as Coumadin, Plavix, Pradaxa, Eloquis, or Xaralta for example, it is OK to take over the counter Ibuprofen or Advil or Motrin or Aleve as directed.  Do not take any of these " medications on an empty stomach.         Dehydration (Adult)  Dehydration occurs when your body loses too much fluid. This may be the result of prolonged vomiting or diarrhea, excessive sweating, or a high fever. It may also happen if you don't drink enough fluid when you're sick or out in the heat. Misuse of diuretics (water pills) can also be a cause.  Symptoms include thirst and decreased urine output. You may also feel dizzy, weak, fatigued, or very drowsy. The diet described below is usually enough to treat dehydration. In some cases, you may need medicine.    Home care  · Drink at least 12 8-ounce glasses of fluid every day to resolve the dehydration. Fluid may include water; orange juice; lemonade; apple, grape, or cranberry juice; clear fruit drinks; electrolyte replacement and sports drinks; and teas and coffee without caffeine. If you have been diagnosed with a kidney disease, ask your doctor how much and what types of fluids you should drink to prevent dehydration. If you have kidney disease, fluid can build up in the body. This can be dangerous to your health.  · If you have a fever, muscle aches, or a headache as a result of a cold or flu, you may take acetaminophen or ibuprofen, unless another medicine was prescribed. If you have chronic liver or kidney disease, or have ever had a stomach ulcer or gastrointestinal bleeding, talk with your health care provider before using these medicines. Don't take aspirin if you are younger than 18 and have a fever. Aspirin raises the chance for severe liver injury.    When to seek medical advice  DEHYDRATION:  · Continued vomiting  · Frequent diarrhea (more than 5 times a day); blood (red or black color) or mucus in diarrhea  · Blood in vomit or stool  · Swollen abdomen or increasing abdominal pain  · Weakness, dizziness, or fainting  · Unusual drowsiness or confusion  · Reduced urine output or extreme thirst    FEVER:  Call your healthcare provider right away if  any of these occur:  · Your child is 3 months old or younger and has a fever of 100.4°F (38°C) or higher. Get medical care right away. Fever in a young baby can be a sign of a dangerous infection.  · Your child is of any age and has repeated fevers above 104°F (40°C).  · Your child is younger than 2 years of age and a fever of 100.4°F (38°C) continues for more than 1 day.  · A fever of 100.4°F (38°C) for more than 3 days in anyone older than 2 years of age.       Why didn't I get a steroid today?    The benefits are small and, unlike topical glucocorticoids, systemic glucocorticoids possess a significant side effect profile. Major side effects include:    · Effects immunity predisposing you to getting a more severe infection and increases your white blood cell count. You have the flu, you do not need anything to weaken your immune system right now.  · Young children -- Growth impairment   · Skin consequences: Skin thinning and ecchymoses, Cushingoid appearance (rounded face), acne, weight gain, mild hirsutism, facial erythema, and striae.  · Eye consequences: Cataracts, increased intraocular pressure, exophthalmos.   · Cardiovascular consequences: Fluid retention, premature atherosclerotic disease, and arrhythmias.   · GI consequences: Increased risk for adverse gastrointestinal effects, such as gastritis, ulcer formation, and gastrointestinal bleeding  · Bone and muscle consequences: Osteoporosis, osteonecrosis, and myopathy.  · Neuropsychiatric effects: Mood disorders, psychosis, memory impairment, and   · Metabolic and Endocrine consequences: Suppress the hypothalamic-pituitary-adrenal (HPA) axis and increase blood sugar.     Can I just have a shot instead of pills?  No. If you are sick, you need a course of treatment (not just a one time dose).     Why didn't I get an antibiotic today?  You have been diagnosed with a virus an antibiotic will not help you. Antibiotics work by destroying the cell walls of  "bacteria, viruses do not have cell walls.     When to seek medical advice:    DEHYDRATION:  · Continued vomiting  · Frequent diarrhea (more than 5 times a day); blood (red or black color) or mucus in diarrhea  · Blood in vomit or stool  · Swollen abdomen or increasing abdominal pain  · Reduced urine output or extreme thirst  · Repeated vomiting after the first 4 hours on fluids  · Occasional vomiting for more than 48 hours  · Frequent diarrhea (more than 5 times a day), blood in diarrhea (red or black color), or mucus in diarrhea  · Blood in vomit or stool  · Swollen abdomen or signs of abdominal pain  · No urine for 8 hours, no tears when crying, "sunken" eyes, or dry mouth  · Unusual behavior changes, fussiness, drowsiness, confusion, or seizure  · Fever (see Fever and children, below)  FEVER:  Call your healthcare provider right away if any of these occur:  · Your child is 3 months old or younger and has a fever of 100.4°F (38°C) or higher. Get medical care right away. Fever in a young baby can be a sign of a dangerous infection.  · Your child is of any age and has repeated fevers above 104°F (40°C).  · Your child is younger than 2 years of age and a fever of 100.4°F (38°C) continues for more than 1 day.  · A fever of 100.4°F (38°C) for more than 3 days in anyone older than 2 years of age.  Call 911  Call 911 or your local emergency services number if the child shows any of these symptoms or signs:  · Trouble breathing  · Confusion  · Is very drowsy or difficult to awaken  · Fainting or loss of consciousness  · Rapid heart rate  · Seizure  · Stiff neck     Your provider discussed your plan of care with you during your physical exam. It was reviewed once more by the provider when giving you an after visit summary. If the patient is a minor, the discharge instructions were discussed with an adult and that adult acknowledge their understanding of the provider's teaching.      "

## 2020-03-18 NOTE — PROGRESS NOTES
"Subjective:       Patient ID: Quan Adams is a 39 y.o. male.    Vitals:  height is 5' 9" (1.753 m) and weight is 96.2 kg (212 lb). His oral temperature is 97.4 °F (36.3 °C). His blood pressure is 126/73 and his pulse is 73. His respiration is 18 and oxygen saturation is 98%.     Chief Complaint: Cough    Patient presents with a mild cough he says that is going away . The cough started last week and is rapidly improving he said ,however and says he feels congested and nasal passages feel blocked somewhat . He denies fever . Onset of congestion he says makes him feel shortness of breath sometimes . Green sputum from nasal passage.     Cough   This is a new problem. The current episode started in the past 7 days. The problem has been rapidly improving. The cough is productive of sputum. Associated symptoms include nasal congestion and shortness of breath. Pertinent negatives include no chest pain, chills, fever, headaches, myalgias, rash or sore throat. Nothing aggravates the symptoms. Treatments tried: tessalon pearls ,cough syrup  The treatment provided mild relief. There is no history of asthma, bronchiectasis, emphysema or pneumonia.       Constitution: Negative for chills, fatigue and fever.   HENT: Positive for congestion. Negative for sore throat.    Neck: Negative for painful lymph nodes.   Cardiovascular: Negative for chest pain and leg swelling.   Eyes: Negative for double vision and blurred vision.   Respiratory: Positive for cough and shortness of breath.    Gastrointestinal: Negative for nausea, vomiting and diarrhea.   Genitourinary: Negative for dysuria, frequency and urgency.   Musculoskeletal: Negative for joint pain, joint swelling, muscle cramps and muscle ache.   Skin: Negative for color change, pale and rash.   Allergic/Immunologic: Negative for seasonal allergies.   Neurological: Negative for dizziness, history of vertigo, light-headedness, passing out and headaches.   Hematologic/Lymphatic: " Negative for swollen lymph nodes, easy bruising/bleeding and history of blood clots. Does not bruise/bleed easily.   Psychiatric/Behavioral: Negative for nervous/anxious, sleep disturbance and depression. The patient is not nervous/anxious.        Objective:      Physical Exam   Constitutional: He is oriented to person, place, and time. He appears well-developed and well-nourished. He is cooperative.  Non-toxic appearance. He does not have a sickly appearance. He does not appear ill. No distress.   HENT:   Head: Normocephalic and atraumatic.   Right Ear: Hearing, external ear and ear canal normal. A middle ear effusion is present.   Left Ear: Hearing, external ear and ear canal normal. A middle ear effusion is present.   Nose: Mucosal edema present. No rhinorrhea or nasal deformity. No epistaxis. Right sinus exhibits no maxillary sinus tenderness and no frontal sinus tenderness. Left sinus exhibits no maxillary sinus tenderness and no frontal sinus tenderness.   Mouth/Throat: Uvula is midline and mucous membranes are normal. No trismus in the jaw. Normal dentition. No uvula swelling. No oropharyngeal exudate, posterior oropharyngeal edema or posterior oropharyngeal erythema.   Eyes: Conjunctivae and lids are normal. No scleral icterus.   Neck: Trachea normal, full passive range of motion without pain and phonation normal. Neck supple. No neck rigidity. No edema and no erythema present.   Cardiovascular: Normal rate, regular rhythm, normal heart sounds, intact distal pulses and normal pulses.   Pulmonary/Chest: Effort normal and breath sounds normal. No respiratory distress. He has no decreased breath sounds. He has no rhonchi.   Abdominal: Normal appearance.   Musculoskeletal: Normal range of motion. He exhibits no edema or deformity.   Lymphadenopathy:     He has cervical adenopathy.        Right cervical: Superficial cervical adenopathy present.        Left cervical: Superficial cervical adenopathy present.    Neurological: He is alert and oriented to person, place, and time. He exhibits normal muscle tone. Coordination normal.   Skin: Skin is warm, dry, intact, not diaphoretic and not pale.   Psychiatric: He has a normal mood and affect. His speech is normal and behavior is normal. Judgment and thought content normal. Cognition and memory are normal.   Nursing note and vitals reviewed.        Assessment:       1. Viral syndrome    2. History of wheezing    3. Cough    4. Nasal congestion    5. Sinus pressure    6. Post-nasal drip    7. Shortness of breath        Plan:         Viral syndrome    History of wheezing  -     X-Ray Chest PA And Lateral; Future; Expected date: 03/18/2020  -     predniSONE (DELTASONE) 20 MG tablet; Take 1 tablet (20 mg total) by mouth once daily. for 3 days  Dispense: 3 tablet; Refill: 0  -     Ambulatory referral/consult to Pulmonology    Cough  -     POCT Influenza A/B    Nasal congestion    Sinus pressure    Post-nasal drip    Shortness of breath  -     predniSONE (DELTASONE) 20 MG tablet; Take 1 tablet (20 mg total) by mouth once daily. for 3 days  Dispense: 3 tablet; Refill: 0  -     Ambulatory referral/consult to Pulmonology          Results for orders placed or performed in visit on 03/18/20   POCT Influenza A/B   Result Value Ref Range    Rapid Influenza A Ag Negative Negative    Rapid Influenza B Ag Negative Negative     Acceptable Yes      Patient Instructions     Always follow your healthcare professional's instructions.    You have received urgent care diagnosis and treatment and you may be released before all of your medical problems are known or treated. Unless you have been given a referral, you (the patient), will arrange for follow-up care as instructed.     If you have been given a referral, [facility] will contact you (their phone number is [phone]). If they do NOT call you by the end of the business day, please call them the following day.    You have  "been diagnosed with Viral Syndrome. TREATMENT: YOUR TREATMENT IS AIMED AT SYMPTOM MANAGEMENT.       A viral illness may cause a number of symptoms. The symptoms depend on the part of the body that the virus affects. If it settles in your nose, throat, and lungs, it may cause cough, sore throat, congestion, and sometimes headache. If it settles in your stomach and intestinal tract, it may cause vomiting and diarrhea. Sometimes it causes vague symptoms like "aching all over," feeling tired, loss of appetite, or fever. A viral illness usually lasts 1 to 2 weeks, but sometimes it lasts longer. In some cases, a more serious infection can look like a viral syndrome in the first few days of the illness; therefore, you may need another exam and additional tests to know the difference.          Home care  Follow these guidelines for taking care of yourself at home:  · If symptoms are severe, rest at home for the first 2 to 3 days.  · Stay away from cigarette smoke - both your smoke and the smoke from others.  · You may use over-the-counter acetaminophen or ibuprofen for fever, muscle aching, and headache, unless another medicine was prescribed for this. If you have chronic liver or kidney disease or ever had a stomach ulcer or GI bleeding, talk with your doctor before using these medicines. No one who is younger than 18 and ill with a fever should take aspirin. It may cause severe disease or death.  · Your appetite may be poor, so a light diet is fine. Avoid dehydration by drinking 8 to 12 8-ounce glasses of fluids each day. This may include water; orange juice; lemonade; apple, grape, and cranberry juice; clear fruit drinks; electrolyte replacement and sports drinks; and decaffeinated teas and coffee. If you have been diagnosed with a kidney disease, ask your doctor how much and what types of fluids you should drink to prevent dehydration. If you have kidney disease, drinking too much fluid can cause it build up in the your " "body and be dangerous to your health.  · Over-the-counter remedies won't shorten the length of the illness but may be helpful for cough, sore throat; and nasal and sinus congestion. Don't use decongestants if you have high blood pressure.    Follow-up care  Follow up with your healthcare provider if your symptoms continue for more than 7 days.    Fever Cough Body Aches Nausea and Vomiting Diarrhea Congestion or Runny Nose    OTC Tylenol   OTC NSAIDs  Tessalon Pearls   Phenergan DM   OTC cough medications  Mobic   OTC Tylenol   OTC NSAIDs  Zofran   Phenergan   OTC Emetrol  DO NOT take ant-diarrheal medications  OTC Claritin, Zyrtec, Allegra, OR Xyzal   OTC Flonase   OTC Benadryl      Cough Allergy Symptoms Asthma Headache or Body Aches   Tessalon Perles are a non-narcotic cough medicine. It works by numbing the throat and lungs, making the cough reflex less active, it is used to relieve coughing during the day. If you have been given Phenergan DM cough syrup, you do NOT need to take both medications at the same time.    Phenergan DM is a combination medication that is used to treat cough. Phenergan DM works like an antihistamine and cough suppressant. This medication will make you sleepy like Benadryl, have a drying effect, and act on a part of the brain (cough center) to reduce the need to cough. DO NOT take Benadryl and Phenergan together. You may take over-the-counter claritin, zyrtec, allegra, or xyzal as directed, these are antihistamines that will work to dry up secretions/mucus. Antihistamines work to block the effects of a certain natural substance (histamine), which causes allergy symptoms.    OR    Use over-the-counter Flonase (a nasal steroid) or prescribed Azelastine (a nasal antihistamine) to treat runny nose, sneezing, itchy nose, nasal congestion, and postnasal drip.    Proper administration is to "look down at your toes and aim for your nose". The goal is to aim for your nasolabial " "folds, the creases in your nose. If you feel the medication drip down your throat, you have NOT administered it correctly. If you can "smell the roses" (floral scent), then you have administered it correctly. If you have a history of asthma, expressed a concern about wheezing or have been told you were wheezing during your exam today, you are being given Albuterol. Albuterol is a bronchodilator that relaxes muscles in the airways and increases air flow to the lungs; it is used to treat or prevent bronchospasm, or narrowing of the airways in the lungs.     If you have a history of asthma, expressed a concern about wheezing or have been told you were wheezing during your exam today and are NOT being prescribed Albuterol that is because you have insured me that you have an adequate supply of the drug at home.    Mobic is a nonsteroidal anti-inflammatory drug (NSAID), it is used to treat inflammation. It reduces pain, swelling, and stiffness of the joints. Please do NOT take any other NSAID medications while on this medication, you can take Tylenol if you feel the need. If you were not prescribed an anti-inflammatory medication, and if you do not have any history of stomach/intestinal ulcers, or kidney disease, or are not taking a blood thinner such as Coumadin, Plavix, Pradaxa, Eloquis, or Xaralta for example, it is OK to take over the counter Ibuprofen or Advil or Motrin or Aleve as directed.  Do not take any of these medications on an empty stomach.         Dehydration (Adult)  Dehydration occurs when your body loses too much fluid. This may be the result of prolonged vomiting or diarrhea, excessive sweating, or a high fever. It may also happen if you don't drink enough fluid when you're sick or out in the heat. Misuse of diuretics (water pills) can also be a cause.  Symptoms include thirst and decreased urine output. You may also feel dizzy, weak, fatigued, or very drowsy. The diet described below is usually enough " to treat dehydration. In some cases, you may need medicine.    Home care  · Drink at least 12 8-ounce glasses of fluid every day to resolve the dehydration. Fluid may include water; orange juice; lemonade; apple, grape, or cranberry juice; clear fruit drinks; electrolyte replacement and sports drinks; and teas and coffee without caffeine. If you have been diagnosed with a kidney disease, ask your doctor how much and what types of fluids you should drink to prevent dehydration. If you have kidney disease, fluid can build up in the body. This can be dangerous to your health.  · If you have a fever, muscle aches, or a headache as a result of a cold or flu, you may take acetaminophen or ibuprofen, unless another medicine was prescribed. If you have chronic liver or kidney disease, or have ever had a stomach ulcer or gastrointestinal bleeding, talk with your health care provider before using these medicines. Don't take aspirin if you are younger than 18 and have a fever. Aspirin raises the chance for severe liver injury.    When to seek medical advice  DEHYDRATION:  · Continued vomiting  · Frequent diarrhea (more than 5 times a day); blood (red or black color) or mucus in diarrhea  · Blood in vomit or stool  · Swollen abdomen or increasing abdominal pain  · Weakness, dizziness, or fainting  · Unusual drowsiness or confusion  · Reduced urine output or extreme thirst    FEVER:  Call your healthcare provider right away if any of these occur:  · Your child is 3 months old or younger and has a fever of 100.4°F (38°C) or higher. Get medical care right away. Fever in a young baby can be a sign of a dangerous infection.  · Your child is of any age and has repeated fevers above 104°F (40°C).  · Your child is younger than 2 years of age and a fever of 100.4°F (38°C) continues for more than 1 day.  · A fever of 100.4°F (38°C) for more than 3 days in anyone older than 2 years of age.       Why didn't I get a steroid today?    The  benefits are small and, unlike topical glucocorticoids, systemic glucocorticoids possess a significant side effect profile. Major side effects include:    · Effects immunity predisposing you to getting a more severe infection and increases your white blood cell count. You have the flu, you do not need anything to weaken your immune system right now.  · Young children -- Growth impairment   · Skin consequences: Skin thinning and ecchymoses, Cushingoid appearance (rounded face), acne, weight gain, mild hirsutism, facial erythema, and striae.  · Eye consequences: Cataracts, increased intraocular pressure, exophthalmos.   · Cardiovascular consequences: Fluid retention, premature atherosclerotic disease, and arrhythmias.   · GI consequences: Increased risk for adverse gastrointestinal effects, such as gastritis, ulcer formation, and gastrointestinal bleeding  · Bone and muscle consequences: Osteoporosis, osteonecrosis, and myopathy.  · Neuropsychiatric effects: Mood disorders, psychosis, memory impairment, and   · Metabolic and Endocrine consequences: Suppress the hypothalamic-pituitary-adrenal (HPA) axis and increase blood sugar.     Can I just have a shot instead of pills?  No. If you are sick, you need a course of treatment (not just a one time dose).     Why didn't I get an antibiotic today?  You have been diagnosed with a virus an antibiotic will not help you. Antibiotics work by destroying the cell walls of bacteria, viruses do not have cell walls.     When to seek medical advice:    DEHYDRATION:  · Continued vomiting  · Frequent diarrhea (more than 5 times a day); blood (red or black color) or mucus in diarrhea  · Blood in vomit or stool  · Swollen abdomen or increasing abdominal pain  · Reduced urine output or extreme thirst  · Repeated vomiting after the first 4 hours on fluids  · Occasional vomiting for more than 48 hours  · Frequent diarrhea (more than 5 times a day), blood in diarrhea (red or black color), or  "mucus in diarrhea  · Blood in vomit or stool  · Swollen abdomen or signs of abdominal pain  · No urine for 8 hours, no tears when crying, "sunken" eyes, or dry mouth  · Unusual behavior changes, fussiness, drowsiness, confusion, or seizure  · Fever (see Fever and children, below)  FEVER:  Call your healthcare provider right away if any of these occur:  · Your child is 3 months old or younger and has a fever of 100.4°F (38°C) or higher. Get medical care right away. Fever in a young baby can be a sign of a dangerous infection.  · Your child is of any age and has repeated fevers above 104°F (40°C).  · Your child is younger than 2 years of age and a fever of 100.4°F (38°C) continues for more than 1 day.  · A fever of 100.4°F (38°C) for more than 3 days in anyone older than 2 years of age.  Call 911  Call 911 or your local emergency services number if the child shows any of these symptoms or signs:  · Trouble breathing  · Confusion  · Is very drowsy or difficult to awaken  · Fainting or loss of consciousness  · Rapid heart rate  · Seizure  · Stiff neck     Your provider discussed your plan of care with you during your physical exam. It was reviewed once more by the provider when giving you an after visit summary. If the patient is a minor, the discharge instructions were discussed with an adult and that adult acknowledge their understanding of the provider's teaching.            "

## 2020-03-22 ENCOUNTER — TELEPHONE (OUTPATIENT)
Dept: URGENT CARE | Facility: CLINIC | Age: 40
End: 2020-03-22

## 2020-03-27 ENCOUNTER — TELEPHONE (OUTPATIENT)
Dept: PULMONOLOGY | Facility: CLINIC | Age: 40
End: 2020-03-27

## 2020-03-27 NOTE — TELEPHONE ENCOUNTER
I left the patient a voicemail to let him know at this time we are only doing virtual visits. Patient would need to sign up at [email or web address]. Patient to call back if he has any questions.

## 2021-02-06 ENCOUNTER — OFFICE VISIT (OUTPATIENT)
Dept: URGENT CARE | Facility: CLINIC | Age: 41
End: 2021-02-06
Payer: COMMERCIAL

## 2021-02-06 VITALS
HEART RATE: 104 BPM | HEIGHT: 69 IN | DIASTOLIC BLOOD PRESSURE: 76 MMHG | TEMPERATURE: 98 F | WEIGHT: 215 LBS | OXYGEN SATURATION: 96 % | SYSTOLIC BLOOD PRESSURE: 122 MMHG | BODY MASS INDEX: 31.84 KG/M2 | RESPIRATION RATE: 18 BRPM

## 2021-02-06 DIAGNOSIS — S93.401A SPRAIN OF RIGHT ANKLE, UNSPECIFIED LIGAMENT, INITIAL ENCOUNTER: Primary | ICD-10-CM

## 2021-02-06 DIAGNOSIS — M25.571 ACUTE RIGHT ANKLE PAIN: ICD-10-CM

## 2021-02-06 PROCEDURE — 73610 XR ANKLE COMPLETE 3 VIEW RIGHT: ICD-10-PCS | Mod: FY,RT,S$GLB, | Performed by: RADIOLOGY

## 2021-02-06 PROCEDURE — 73610 X-RAY EXAM OF ANKLE: CPT | Mod: FY,RT,S$GLB, | Performed by: RADIOLOGY

## 2021-02-06 PROCEDURE — 3008F PR BODY MASS INDEX (BMI) DOCUMENTED: ICD-10-PCS | Mod: CPTII,S$GLB,, | Performed by: PHYSICIAN ASSISTANT

## 2021-02-06 PROCEDURE — 99214 PR OFFICE/OUTPT VISIT, EST, LEVL IV, 30-39 MIN: ICD-10-PCS | Mod: S$GLB,,, | Performed by: PHYSICIAN ASSISTANT

## 2021-02-06 PROCEDURE — 99214 OFFICE O/P EST MOD 30 MIN: CPT | Mod: S$GLB,,, | Performed by: PHYSICIAN ASSISTANT

## 2021-02-06 PROCEDURE — 3008F BODY MASS INDEX DOCD: CPT | Mod: CPTII,S$GLB,, | Performed by: PHYSICIAN ASSISTANT
